# Patient Record
(demographics unavailable — no encounter records)

---

## 2024-11-27 NOTE — HISTORY OF PRESENT ILLNESS
[de-identified] : Yonathan is a 6 year old girl with recurrent UTI, who is followed for overeating, constipation s/p cleanout, gas and distention.  Normal labs.     Since the last visit in April 2024, [place] [facility] [place]. Workup significant for KUB with moderate stool burden for which she received an enema.  I spoke with mother over the phone the next day and recommended a mini-cleanout. Mother cannot recall now whether Yonathan did it.  Yonathan still has a good appetite though less than before, and is gaining weight though % decreased from 33% to 21%.  There is no report of nausea, reflux, vomiting, or abdominal pain.    Yonathan has diarrhea after cheese or milk so now consumes Lactaid milk and a primarily lactose-free diet. Takes  lactase pills prn. She is still gassy from below, smelly.  Not burping.    Yonathan stools once a day, [place] 3-5, without straining or rectal pain.  There is no blood, mucus, steatorrhea, fecal accidents or diarrhea.  Not distended.  GI WORKUP: - Normal TSH, Ca, Mag, TTG IgA with serum IgA - KUB 11/2023 with large stool burden in colon and rectum (on my review). Underwent cleanout with Ped Fleet enema followed by Miralax 7 packets.  - KUB 2/2024 with large stool burden in L colon and rectum (on my review). Underwent cleanout with Ped Fleet enema followed by Miralax 4 packets.  Distention improved.  ED WORKUP 4/1/24: - Generally normal CBC, CMP, lipase, UA. - KUB with moderate stool burden (had mild pain at time of imaging).  Stooled after enema given in ED.   - US neg for intussusception (not done at time of pain).

## 2024-11-27 NOTE — CONSULT LETTER
[Dear  ___] : Dear  [unfilled], [Consult Letter:] : I had the pleasure of evaluating your patient, [unfilled]. [Please see my note below.] : Please see my note below. [Consult Closing:] : Thank you very much for allowing me to participate in the care of this patient.  If you have any questions, please do not hesitate to contact me. [Sincerely,] : Sincerely, [FreeTextEntry3] : June Kaur MD [facility]

## 2024-11-27 NOTE — REVIEW OF SYSTEMS
[Murmur] : murmur [History of UTI] : history of urinary tract infections [Negative] : Skin [Fever] : no fever [Oral Ulcer] : no oral ulcer [Asthma] : no asthma [Pneumonia] : no pneumonia [Joint Pain] : no joint pain [Rash] : no rash [FreeTextEntry3] : no inflammation/pain  [FreeTextEntry8] : urinary accidents improved

## 2024-11-27 NOTE — CONSULT LETTER
[Dear  ___] : Dear  [unfilled], [Consult Letter:] : I had the pleasure of evaluating your patient, [unfilled]. [Please see my note below.] : Please see my note below. [Consult Closing:] : Thank you very much for allowing me to participate in the care of this patient.  If you have any questions, please do not hesitate to contact me. [Sincerely,] : Sincerely, [FreeTextEntry3] : June Kaur MD [facility]

## 2024-11-27 NOTE — REVIEW OF SYSTEMS
[Murmur] : murmur [History of UTI] : history of urinary tract infections [Negative] : Skin [Fever] : no fever [Oral Ulcer] : no oral ulcer [Asthma] : no asthma [Pneumonia] : no pneumonia [Joint Pain] : no joint pain [Rash] : no rash [FreeTextEntry8] : urinary accidents improved  [FreeTextEntry3] : no inflammation/pain

## 2024-11-27 NOTE — ASSESSMENT
[FreeTextEntry1] : ASSESSMENT: 1.  Overeating - now consuming decreased portions. Gaining weight but weight % decreased from 33% to 21% (BMI 51%).  Workup consistent with normal labs.  2.  Constipation, gas and distention - improved after cleanout.  Currently still gassy which is foul-smelling. 3.  Severe abdominal pain and 2 loose nonbloody stools - resolved 4.  Lactose intolerance clinically - on a primarily lactose-free diet and prn lactase pills 5.  Recurrent UTIs  PLAN: -  Restart probiotics. Start Iberogast. -  Minimize gassy food products.  Mylicon Jr prn gas pain.  -  Continue lactose-free diet and prn lactase pills. -  Family preferred to get KUB to eval stool and gas burden.  Script given. -  Family declined sending remaining O&P x2 and seeing Nutr.   -  Monitor weight and po intake.  Pediasure prn.  -  Follow up in 4 months (in person or telehealth if family can obtain a weight before visit).  Family to call if problems. All questions answered.

## 2024-11-27 NOTE — HISTORY OF PRESENT ILLNESS
[de-identified] : Yonathan is a 6 year old girl with recurrent UTI, who is followed for overeating, constipation s/p cleanout, gas and distention.  Normal labs.     Since the last visit in April 2024, Yonathan went to ED at [facility] [place] on [place]/1 for abdominal pain. Workup significant for KUB with moderate stool burden for which she received an enema.  I spoke with mother over the phone the next day and recommended a mini-cleanout. Mother cannot recall now whether Yonathan did it.  Yonathan still has a good appetite though less than before, and is gaining weight though % decreased from 33% to 21%.  There is no report of nausea, reflux, vomiting, or abdominal pain.    Yonathan has diarrhea after cheese or milk so now consumes Lactaid milk and a primarily lactose-free diet. Takes  lactase pills prn. She is still gassy from below, smelly.  Not burping.    Yonathan stools once a day, [place] 3-5, without straining or rectal pain.  There is no blood, mucus, steatorrhea, fecal accidents or diarrhea.  Not distended.  GI WORKUP: - Normal TSH, Ca, Mag, TTG IgA with serum IgA - KUB 11/2023 with large stool burden in colon and rectum (on my review). Underwent cleanout with Ped Fleet enema followed by Miralax 7 packets.  - KUB 2/2024 with large stool burden in L colon and rectum (on my review). Underwent cleanout with Ped Fleet enema followed by Miralax 4 packets.  Distention improved.  ED WORKUP 4/1/24: - Generally normal CBC, CMP, lipase, UA. - KUB with moderate stool burden (had mild pain at time of imaging).  Stooled after enema given in ED.   - US neg for intussusception (not done at time of pain).

## 2024-11-27 NOTE — PHYSICAL EXAM
[Well Developed] : well developed [Well Nourished] : well nourished [Alert and Active] : alert and active [Verbal] : verbal [Interactive] : interactive [NAD] : in no acute distress [Moist & Pink Mucous Membranes] : moist and pink mucous membranes [Normal Oropharynx] : the oropharynx was normal [Soft] : soft  [Normal Bowel Sounds] : normal bowel sounds [No HSM] : no hepatosplenomegaly appreciated [No Back Lesion] : no back lesion [Normal rectal exam] : exam was normal [Rectal Exam Deferred] : rectal exam was deferred [Well-Perfused] : well-perfused [CTAB] : lungs clear to auscultation bilaterally [Regular Rate and Rhythm] : regular rate and rhythm [Normal S1, S2] : normal S1 and S2 [icteric] : anicteric [Oral Ulcers] : no oral ulcers [Respiratory Distress] : no respiratory distress  [Distended] : non distended [Tender] : non tender [Joint Swelling] : no joint swelling [Cyanosis] : no cyanosis [Rash] : no rash [Jaundice] : no jaundice